# Patient Record
Sex: MALE | Race: WHITE | NOT HISPANIC OR LATINO | Employment: OTHER | URBAN - METROPOLITAN AREA
[De-identification: names, ages, dates, MRNs, and addresses within clinical notes are randomized per-mention and may not be internally consistent; named-entity substitution may affect disease eponyms.]

---

## 2020-06-18 ENCOUNTER — TELEPHONE (OUTPATIENT)
Dept: ORTHOPEDICS | Facility: CLINIC | Age: 85
End: 2020-06-18

## 2020-06-18 NOTE — TELEPHONE ENCOUNTER
----- Message from Mandi Barboza sent at 6/18/2020  1:32 PM CDT -----  Pt states that the Dentist at [facility]  needs a letter faxed to [phone] stating Pt doesn't need the antibiotic for the procedure.   ----- Message -----  From: Mandi Barboza  Sent: 6/18/2020  10:51 AM CDT  To: Breana Jones LPN    Pt states that he will be having dental work and wants to know if he needs antibiotic.  TKA was done approx -5 years by Dr Hinson.  Pt call back # [phone]